# Patient Record
Sex: MALE
[De-identification: names, ages, dates, MRNs, and addresses within clinical notes are randomized per-mention and may not be internally consistent; named-entity substitution may affect disease eponyms.]

---

## 2018-09-26 ENCOUNTER — HOSPITAL ENCOUNTER (OUTPATIENT)
Dept: HOSPITAL 88 - US | Age: 53
End: 2018-09-26
Attending: INTERNAL MEDICINE
Payer: MEDICARE

## 2018-09-26 DIAGNOSIS — E66.9: ICD-10-CM

## 2018-09-26 DIAGNOSIS — K76.0: Primary | ICD-10-CM

## 2018-09-26 DIAGNOSIS — Z71.3: ICD-10-CM

## 2018-09-26 PROCEDURE — 76700 US EXAM ABDOM COMPLETE: CPT

## 2018-09-26 NOTE — DIAGNOSTIC IMAGING REPORT
EXAM: Complete Abdominal Ultrasound

INDICATION:        \S\FATTY LIVER

COMPARISON: None. 

TECHNIQUE: Transverse and longitudinal images of the upper abdomen were

obtained. 



FINDINGS:     

Liver:

     Size: 13.7 cm in the right midclavicular line, normal

     Appearance: Increased echogenicity, smooth contour

     Mass: No focal masses



Spleen: 

     Size: 9.2 cm in length, normal  

     Echogenicity: Normal

     Mass: No focal masses



Gallbladder:

     Cholecystectomy



Bile Ducts:

     Intrahepatic Ducts: No dilatation

     Extrahepatic Ducts: Common bile duct measures 0.5 cm, no dilatation



Pancreas:

     Visualized portions of the pancreatic head, neck and proximal body are

normal.



Right Kidney:

     Size:  10.8 x 5.2 x 5.0 cm

     Echogenicity:  Normal

     Parenchymal thickness: Normal 

     Collecting System:  No hydronephrosis

     Stone:  None

     Cyst/Mass: None

             

Left Kidney:  

     Size:  13.2 x 5.8 x 5.6 cm

     Echogenicity:  Normal

     Parenchymal thickness: Normal 

     Collecting System:  No hydronephrosis

     Stone:  None

     Cyst/Mass: None



Vessels:

     Aorta: Visualized portions are normal

     Inferior Vena Cava: Visualized portions are normal

     Main Portal Vein: 0.9 cm, normal size with hepatopetal flow.



Free Fluid:

     No ascites or pleural effusion



IMPRESSION:

Diffuse hepatic steatosis without focal mass.



Signed by: Dr. Terry Ledbetter M.D. on 9/26/2018 11:16 AM